# Patient Record
Sex: MALE
[De-identification: names, ages, dates, MRNs, and addresses within clinical notes are randomized per-mention and may not be internally consistent; named-entity substitution may affect disease eponyms.]

---

## 2017-06-02 NOTE — PATIENT DISCUSSION
Patient started on BRIMONIDINE 0.15% or 0.2% TID for neuroprotection and DORZOLAMIDE TID for blood flow till bottle runs out.

## 2017-06-02 NOTE — PATIENT DISCUSSION
RECOM EVAL IN ER TODAY - DIZZINESS AND HA - TO EVALUATE FOR CNS INVOLVMENT AND TO RULE OUT TEMPORAL ARTERITIS.

## 2017-06-02 NOTE — PATIENT DISCUSSION
Carotid Doppler’s 50-69% bilateral obstruction on 6 4 17 -  PT SENT TO VASCULAR SURGEON BY ER - echo cardiogram pending.

## 2017-06-23 NOTE — PATIENT DISCUSSION
CAROTID DOPPLER'S DONE 6 5 17 - SHOWED BILATERAL PROGRESSION OF  CAROTID STENOSIS  50-69% OU - S/P CAROTID ON RIGHT  - TENOSIS WAS 70% - IS GOING TO REQUIRE ADDITIONAL SURGERY.

## 2017-09-29 NOTE — PATIENT DISCUSSION
CONT GENERIC PLAVIX - PT WAS TOLD TO STOP TAKING IT - RECOM F/U INTERNIST TO SEE IF SHE CAN GO BACK ON IT AFTER SURGERY MONDAY.

## 2018-09-08 ENCOUNTER — HOSPITAL (OUTPATIENT)
Dept: OTHER | Age: 68
End: 2018-09-08
Attending: EMERGENCY MEDICINE

## 2021-10-08 NOTE — PATIENT DISCUSSION
10 8 21 FLUCTUATES - BETTER THAN ORIGINAL VF BUT NOT AS GOOD AS LAST FIELD - RECOM REEVAL WITH DR SMITH.

## 2021-12-07 ENCOUNTER — RESULT REVIEW (OUTPATIENT)
Age: 71
End: 2021-12-07

## 2021-12-08 ENCOUNTER — RESULT REVIEW (OUTPATIENT)
Age: 71
End: 2021-12-08

## 2021-12-10 ENCOUNTER — RESULT REVIEW (OUTPATIENT)
Age: 71
End: 2021-12-10

## 2021-12-10 PROBLEM — Z00.00 ENCOUNTER FOR PREVENTIVE HEALTH EXAMINATION: Status: ACTIVE | Noted: 2021-12-10

## 2021-12-14 ENCOUNTER — APPOINTMENT (OUTPATIENT)
Dept: HEMATOLOGY ONCOLOGY | Facility: CLINIC | Age: 71
End: 2021-12-14
Payer: MEDICARE

## 2021-12-14 ENCOUNTER — NON-APPOINTMENT (OUTPATIENT)
Age: 71
End: 2021-12-14

## 2021-12-14 VITALS
HEIGHT: 70 IN | SYSTOLIC BLOOD PRESSURE: 134 MMHG | OXYGEN SATURATION: 97 % | HEART RATE: 76 BPM | RESPIRATION RATE: 18 BRPM | DIASTOLIC BLOOD PRESSURE: 77 MMHG | BODY MASS INDEX: 27.63 KG/M2 | TEMPERATURE: 98.2 F | WEIGHT: 193 LBS

## 2021-12-14 DIAGNOSIS — Z80.8 FAMILY HISTORY OF MALIGNANT NEOPLASM OF OTHER ORGANS OR SYSTEMS: ICD-10-CM

## 2021-12-14 DIAGNOSIS — Z80.1 FAMILY HISTORY OF MALIGNANT NEOPLASM OF TRACHEA, BRONCHUS AND LUNG: ICD-10-CM

## 2021-12-14 DIAGNOSIS — Z78.9 OTHER SPECIFIED HEALTH STATUS: ICD-10-CM

## 2021-12-14 PROCEDURE — 36415 COLL VENOUS BLD VENIPUNCTURE: CPT

## 2021-12-14 PROCEDURE — 99215 OFFICE O/P EST HI 40 MIN: CPT | Mod: 25

## 2021-12-14 RX ORDER — PANTOPRAZOLE 40 MG/1
40 TABLET, DELAYED RELEASE ORAL
Refills: 0 | Status: ACTIVE | COMMUNITY
Start: 2021-12-14

## 2021-12-14 RX ORDER — METHYLDOPA/HYDROCHLOROTHIAZIDE 250MG-15MG
TABLET ORAL
Refills: 0 | Status: ACTIVE | COMMUNITY
Start: 2021-12-14

## 2021-12-14 RX ORDER — MULTIVITAMIN
TABLET ORAL
Refills: 0 | Status: ACTIVE | COMMUNITY
Start: 2021-12-14

## 2021-12-14 NOTE — HISTORY OF PRESENT ILLNESS
[de-identified] : Mr. Fish is a 71 year old man who presents for initial consultation of recurrent PE.\par He was admitted to [place] 11/21/2021 for dyspnea and cough with right sided chest pain radiating to his back.\par He was started on eliquis as inpatient.\par He was also found to be febrile, and have pneumonia with a pleural effusion; s/p antibiotics and chest tube placement.\par Blood work completed as inpatient: +cardiolipins, low antithrombin III, factor VIII, factor X\par Factor V Leiden and prothrombin gene mutation normal\par He was readmitted to [facility] 12/6/2021 for SOB and nagging cough, chest tube placed again s/p TPA; discharged 12/13/2021, in middle of 21 day course antibiotics ceftriaxone -> ceftin\par \par Of note, he developed prior PE ~4 years ago (was on AC x 1 year, provoked by 32-hour travel in car) and he was also briefly on MTX for RA\par He reports feeling better, denies fevers, SOB, palpitations, nausea, vomiting, constipation, diarrhea, and bleeding.\par He has lost about 10-12lbs due to recurrent hospitalizations.\par \par He works as contractor\par \par 2 Children and 4 grandchildren alive and well\par Mother: unknown clotting disorder

## 2021-12-14 NOTE — ASSESSMENT
[FreeTextEntry1] : #PE\par seems unprovoked, mother has family history of clots\par Prothrombin gene mutation and factor V Leiden normal\par rest of work up can not be conclusive in the acute clot setting - will need to recheck in 3-6 months\par continue eliquis 5 mg BID - refilled today. Reviewed side effects and he expresses understanding\par \par \par #PNA\par currently on ertapenum and followed by Dr. Major\par Followed by Dr. Quinones\par s/p chest tube for pleural effusion\par \par #RA\par Currently not on medications for it\par \par RTC in 3 months with cbc with diff, cmp, d dimer, hypercoag work up minus factor v leiden and prothrombin gene mutation but including protein c and protein s

## 2022-01-10 ENCOUNTER — APPOINTMENT (OUTPATIENT)
Dept: PULMONOLOGY | Facility: CLINIC | Age: 72
End: 2022-01-10
Payer: COMMERCIAL

## 2022-01-10 VITALS
HEIGHT: 70 IN | TEMPERATURE: 98.1 F | DIASTOLIC BLOOD PRESSURE: 80 MMHG | HEART RATE: 77 BPM | OXYGEN SATURATION: 98 % | WEIGHT: 193 LBS | BODY MASS INDEX: 27.63 KG/M2 | SYSTOLIC BLOOD PRESSURE: 150 MMHG

## 2022-01-10 DIAGNOSIS — I27.20 PULMONARY HYPERTENSION, UNSPECIFIED: ICD-10-CM

## 2022-01-10 PROCEDURE — 99214 OFFICE O/P EST MOD 30 MIN: CPT

## 2022-01-10 NOTE — REASON FOR VISIT
[Follow-Up - From Hospitalization] : a follow-up visit after a recent hospitalization [Pneumonia] : pneumonia [Pulmonary Embolism] : pulmonary embolism [Pulmonary Hypertension] : pulmonary hypertension

## 2022-01-10 NOTE — PHYSICAL EXAM
[No Acute Distress] : no acute distress [Normal Appearance] : normal appearance [No Neck Mass] : no neck mass [Normal Rate/Rhythm] : normal rate/rhythm [Normal S1, S2] : normal s1, s2 [No Edema] : no edema [No Resp Distress] : no resp distress [No Acc Muscle Use] : no acc muscle use [Clear to Auscultation Bilaterally] : clear to auscultation bilaterally [No Abnormalities] : no abnormalities [Normal Gait] : normal gait [No Clubbing] : no clubbing [No Cyanosis] : no cyanosis [No Focal Deficits] : no focal deficits [Oriented x3] : oriented x3 [Normal Affect] : normal affect

## 2022-01-10 NOTE — HISTORY OF PRESENT ILLNESS
[TextBox_4] : 71 year old male with RA not on treatment came for f/u after hospitalization.\par He was initially admitted in Nov 2021 with dyspnea and CP and was diagnosed with PE with mild PH.\par Was started on AC and d/c home. Few days later he became febrile and had more cough and was readmitted with new RtLL infiltrate and loculated Rt effusion. He required pigtail placement and tPA in the pleural space.\par Was d/c on prolonged course of ABX.\par Last CXR seen, my read: significant improvement in the Rt effusion and Rt infiltrate\par Was seen by Hematology, note seen.\par He had a provoked PE few years ago and was on short term AC\par \par He feels better since the hospital admission. Dyspnea is much better. Able to climb 1 flight of stairs.\par He finished ABX 1 week ago.\par Denies fever. \par No nausea vomiting.\par Denies cough, CP, hemoptysis\par \par \par \par SHX: non smoker\par FHX: blood clots\par \par

## 2022-02-22 NOTE — HISTORY OF PRESENT ILLNESS
[de-identified] : Mr. Fish is a 71 year old man who presents for initial consultation of recurrent PE.\par He was admitted to [facility] 11/21/2021 for dyspnea and cough with right sided chest pain radiating to his back.\par He was started on eliquis as inpatient.\par He was also found to be febrile, and have pneumonia with a pleural effusion; s/p antibiotics and chest tube placement.\par Blood work completed as inpatient: +cardiolipins, low antithrombin III, factor VIII, factor X\par Factor V Leiden and prothrombin gene mutation normal\par He was readmitted to [facility] 12/6/2021 for SOB and nagging cough, chest tube placed again s/p TPA; discharged 12/13/2021, in middle of 21 day course antibiotics ceftriaxone -> ceftin\par \par Of note, he developed prior PE ~4 years ago (was on AC x 1 year, provoked by 32-hour travel in car) and he was also briefly on MTX for RA\par He reports feeling better, denies fevers, SOB, palpitations, nausea, vomiting, constipation, diarrhea, and bleeding.\par He has lost about 10-12lbs due to recurrent hospitalizations.\par \par He works as contractor\par \par 2 Children and 4 grandchildren alive and well\par Mother: unknown clotting disorder

## 2022-02-28 ENCOUNTER — APPOINTMENT (OUTPATIENT)
Dept: HEMATOLOGY ONCOLOGY | Facility: CLINIC | Age: 72
End: 2022-02-28

## 2022-03-14 ENCOUNTER — EMERGENCY VISIT (OUTPATIENT)
Dept: URBAN - METROPOLITAN AREA CLINIC 47 | Facility: CLINIC | Age: 72
End: 2022-03-14

## 2022-03-14 DIAGNOSIS — H04.123: ICD-10-CM

## 2022-03-14 DIAGNOSIS — Z96.1: ICD-10-CM

## 2022-03-14 DIAGNOSIS — H26.493: ICD-10-CM

## 2022-03-14 PROCEDURE — 92002 INTRM OPH EXAM NEW PATIENT: CPT

## 2022-03-14 PROCEDURE — 99199RSP RESIDENT SUPERVISED BY PROVIDER

## 2022-03-14 ASSESSMENT — VISUAL ACUITY
OD_SC: 20/20
OS_SC: J4
OS_SC: 20/20
OD_SC: J4

## 2022-03-14 ASSESSMENT — TONOMETRY
OS_IOP_MMHG: 11
OD_IOP_MMHG: 9

## 2022-03-14 NOTE — PATIENT DISCUSSION
Overlapping of images improved with instillation of ATs. Pt stated last night sleeping with a fan on. Consider cyclosporine if no improvement with ATs.

## 2022-03-16 NOTE — PATIENT DISCUSSION
PT HAD CAROTID 3 MONTHS AGO - IS FOLLOWING UP WITH INTERNIST ON THESE. Spoke with patient regarding rescheduling missed POST-OP appointment . Scheduled patient for a Telephone Visit due to patient having car issues. 1-2 wk po dos 3/2/22  Provided email for patient to send photos prior to appointment at  [email or web address] . Patient is aware of date and time of appointment.-Per Patient    normal... Well appearing, well nourished, awake, alert, oriented to person, place, time/situation and in no apparent distress.

## 2022-03-28 ENCOUNTER — COMPREHENSIVE EXAM (OUTPATIENT)
Dept: URBAN - METROPOLITAN AREA CLINIC 47 | Facility: CLINIC | Age: 72
End: 2022-03-28

## 2022-03-28 DIAGNOSIS — H52.4: ICD-10-CM

## 2022-03-28 DIAGNOSIS — H04.123: ICD-10-CM

## 2022-03-28 DIAGNOSIS — H35.362: ICD-10-CM

## 2022-03-28 DIAGNOSIS — H26.493: ICD-10-CM

## 2022-03-28 DIAGNOSIS — Z96.1: ICD-10-CM

## 2022-03-28 PROCEDURE — 92015 DETERMINE REFRACTIVE STATE: CPT

## 2022-03-28 PROCEDURE — 92014 COMPRE OPH EXAM EST PT 1/>: CPT

## 2022-03-28 ASSESSMENT — TONOMETRY
OS_IOP_MMHG: 10
OD_IOP_MMHG: 10

## 2022-03-28 ASSESSMENT — VISUAL ACUITY
OD_CC: J1
OS_SC: 20/20
OS_SC: J8
OD_SC: 20/20
OS_CC: J1
OU_SC: J8
OD_SC: J8
OU_CC: J1
OU_SC: 20/20

## 2022-04-11 ENCOUNTER — NON-APPOINTMENT (OUTPATIENT)
Age: 72
End: 2022-04-11

## 2022-06-30 ENCOUNTER — RESULT REVIEW (OUTPATIENT)
Age: 72
End: 2022-06-30

## 2022-06-30 ENCOUNTER — APPOINTMENT (OUTPATIENT)
Dept: HEMATOLOGY ONCOLOGY | Facility: CLINIC | Age: 72
End: 2022-06-30

## 2022-06-30 VITALS
WEIGHT: 187.6 LBS | BODY MASS INDEX: 26.86 KG/M2 | RESPIRATION RATE: 18 BRPM | HEART RATE: 56 BPM | OXYGEN SATURATION: 97 % | HEIGHT: 70 IN | TEMPERATURE: 98.2 F | SYSTOLIC BLOOD PRESSURE: 130 MMHG | DIASTOLIC BLOOD PRESSURE: 78 MMHG

## 2022-08-01 ENCOUNTER — NON-APPOINTMENT (OUTPATIENT)
Age: 72
End: 2022-08-01

## 2022-09-12 ENCOUNTER — NON-APPOINTMENT (OUTPATIENT)
Age: 72
End: 2022-09-12

## 2022-09-13 ENCOUNTER — RESULT REVIEW (OUTPATIENT)
Age: 72
End: 2022-09-13

## 2022-09-13 ENCOUNTER — APPOINTMENT (OUTPATIENT)
Dept: HEMATOLOGY ONCOLOGY | Facility: CLINIC | Age: 72
End: 2022-09-13

## 2022-09-13 VITALS
OXYGEN SATURATION: 98 % | HEART RATE: 60 BPM | SYSTOLIC BLOOD PRESSURE: 135 MMHG | RESPIRATION RATE: 18 BRPM | DIASTOLIC BLOOD PRESSURE: 78 MMHG | WEIGHT: 182 LBS | BODY MASS INDEX: 26.05 KG/M2 | TEMPERATURE: 97.5 F | HEIGHT: 70 IN

## 2023-06-12 ENCOUNTER — COMPREHENSIVE EXAM (OUTPATIENT)
Dept: URBAN - METROPOLITAN AREA CLINIC 47 | Facility: CLINIC | Age: 73
End: 2023-06-12

## 2023-06-12 DIAGNOSIS — H04.123: ICD-10-CM

## 2023-06-12 DIAGNOSIS — H52.4: ICD-10-CM

## 2023-06-12 DIAGNOSIS — H35.362: ICD-10-CM

## 2023-06-12 DIAGNOSIS — Z96.1: ICD-10-CM

## 2023-06-12 DIAGNOSIS — H26.493: ICD-10-CM

## 2023-06-12 PROCEDURE — 92015 DETERMINE REFRACTIVE STATE: CPT

## 2023-06-12 PROCEDURE — 92014 COMPRE OPH EXAM EST PT 1/>: CPT

## 2023-06-12 ASSESSMENT — VISUAL ACUITY
OS_SC: J10
OS_SC: 20/20
OD_SC: J10
OD_SC: 20/25
OU_SC: J6
OU_SC: 20/20

## 2023-06-12 ASSESSMENT — TONOMETRY
OD_IOP_MMHG: 13
OS_IOP_MMHG: 10

## 2023-07-10 ENCOUNTER — APPOINTMENT (OUTPATIENT)
Dept: HEMATOLOGY ONCOLOGY | Facility: CLINIC | Age: 73
End: 2023-07-10

## 2023-08-07 NOTE — PATIENT DISCUSSION
ARTIFICIAL TEARS to affected eye(s) as needed.
CONT GENERIC PLAVIX.
Carotid Doppler’s and echo cardiogram pending.
Does NOT APPEAR VISUALLY SIGNIFICANT.
MONITOR response to TREATMENT.
My findings and recommendations are based on patient's symptoms, eye exam, diagnostic testing, and records.
No retinal holes or tears seen on exam. Recommended OBSERVATION. We reviewed the signs and symptoms of retinal tear/retinal detachment and the importance of prompt evaluation should there be increasing floaters, new flashing lights, or decreasing peripheral vision in either eye at any time. Patient understands condition, prognosis and need for follow up care.
No retinal tears or retinal detachment seen on clinical exam today. Reviewed the signs and symptoms of retinal tear/retinal detachment and the importance of calling for prompt evaluation should there be increasing floaters, new flashing lights, or decreasing peripheral vision in either eye at any time. Observation recommended.
Patient started on BRIMONIDINE 0.15% or 0.2% TID for neuroprotection and DORZOLAMIDE TID for blood flow till bottle runs out.
RECOM EVAL IN ER TODAY - DIZZINESS AND HA - TO EVALUATE FOR CNS INVOLVMENT AND TO RULE OUT TEMPORAL ARTERITIS.
Recommend CAROTID DOPPLER'S AND ECHO CARDIOGRAM.
Recommended OBSERVATION and MONITORING for change.
Recommended OBSERVATION and continued MONITORING for progression.
Recommended stat SED RATE and CRP for temporal (giant cell) arteritis.
Well positioned.
none

## 2023-10-23 ENCOUNTER — RESULT REVIEW (OUTPATIENT)
Age: 73
End: 2023-10-23

## 2023-10-23 ENCOUNTER — APPOINTMENT (OUTPATIENT)
Dept: HEMATOLOGY ONCOLOGY | Facility: CLINIC | Age: 73
End: 2023-10-23
Payer: MEDICARE

## 2023-10-23 VITALS
WEIGHT: 196.38 LBS | BODY MASS INDEX: 28.12 KG/M2 | RESPIRATION RATE: 16 BRPM | DIASTOLIC BLOOD PRESSURE: 89 MMHG | SYSTOLIC BLOOD PRESSURE: 152 MMHG | HEART RATE: 63 BPM | TEMPERATURE: 97.2 F | OXYGEN SATURATION: 98 % | HEIGHT: 70 IN

## 2023-10-23 DIAGNOSIS — I26.99 OTHER PULMONARY EMBOLISM W/OUT ACUTE COR PULMONALE: ICD-10-CM

## 2023-10-23 DIAGNOSIS — D64.9 ANEMIA, UNSPECIFIED: ICD-10-CM

## 2023-10-23 DIAGNOSIS — J18.9 PNEUMONIA, UNSPECIFIED ORGANISM: ICD-10-CM

## 2023-10-23 DIAGNOSIS — M06.9 RHEUMATOID ARTHRITIS, UNSPECIFIED: ICD-10-CM

## 2023-10-23 PROCEDURE — 36415 COLL VENOUS BLD VENIPUNCTURE: CPT

## 2023-10-23 PROCEDURE — 99214 OFFICE O/P EST MOD 30 MIN: CPT | Mod: 25

## 2023-10-23 RX ORDER — APIXABAN 5 MG/1
5 TABLET, FILM COATED ORAL
Qty: 60 | Refills: 2 | Status: DISCONTINUED | COMMUNITY
Start: 2021-12-14 | End: 2023-10-23

## 2023-10-23 RX ORDER — APIXABAN 5 MG/1
5 TABLET, FILM COATED ORAL
Qty: 60 | Refills: 3 | Status: DISCONTINUED | COMMUNITY
Start: 2021-12-14 | End: 2023-10-23

## 2023-10-23 RX ORDER — APIXABAN 2.5 MG/1
2.5 TABLET, FILM COATED ORAL
Qty: 180 | Refills: 3 | Status: ACTIVE | COMMUNITY
Start: 2022-09-23 | End: 1900-01-01

## 2023-11-18 PROBLEM — D64.9 ANEMIA, UNSPECIFIED TYPE: Status: ACTIVE | Noted: 2023-11-18

## 2023-11-18 PROBLEM — M06.9 RHEUMATOID ARTHRITIS: Status: ACTIVE | Noted: 2021-12-14

## 2023-11-18 PROBLEM — I26.99 PULMONARY EMBOLISM: Status: ACTIVE | Noted: 2021-12-14

## 2023-11-18 PROBLEM — J18.9 PNEUMONIA: Status: ACTIVE | Noted: 2021-12-14

## 2023-12-11 ENCOUNTER — RESULT REVIEW (OUTPATIENT)
Age: 73
End: 2023-12-11

## 2023-12-11 ENCOUNTER — APPOINTMENT (OUTPATIENT)
Dept: HEMATOLOGY ONCOLOGY | Facility: CLINIC | Age: 73
End: 2023-12-11

## 2023-12-11 VITALS
BODY MASS INDEX: 28.21 KG/M2 | DIASTOLIC BLOOD PRESSURE: 84 MMHG | WEIGHT: 197.06 LBS | SYSTOLIC BLOOD PRESSURE: 153 MMHG | HEART RATE: 63 BPM | OXYGEN SATURATION: 97 % | HEIGHT: 70 IN | RESPIRATION RATE: 16 BRPM | TEMPERATURE: 97.4 F

## 2023-12-12 NOTE — PATIENT DISCUSSION
My findings and recommendations are based on patient's symptoms, eye exam, diagnostic testing, and records. none

## 2024-02-21 NOTE — PATIENT DISCUSSION
Chronic ongoing problem, continue medical therapy with levothyroxine 25 mcg daily.   Hx BRAO. Stable.

## 2024-07-03 ENCOUNTER — COMPREHENSIVE EXAM (OUTPATIENT)
Dept: URBAN - METROPOLITAN AREA CLINIC 47 | Facility: CLINIC | Age: 74
End: 2024-07-03

## 2024-07-03 DIAGNOSIS — H26.493: ICD-10-CM

## 2024-07-03 DIAGNOSIS — H04.123: ICD-10-CM

## 2024-07-03 DIAGNOSIS — H52.4: ICD-10-CM

## 2024-07-03 DIAGNOSIS — H35.363: ICD-10-CM

## 2024-07-03 DIAGNOSIS — Z96.1: ICD-10-CM

## 2024-07-03 PROCEDURE — 92250 FUNDUS PHOTOGRAPHY W/I&R: CPT

## 2024-07-03 PROCEDURE — 92015 DETERMINE REFRACTIVE STATE: CPT

## 2024-07-03 PROCEDURE — 99214 OFFICE O/P EST MOD 30 MIN: CPT

## 2024-07-03 RX ORDER — CYCLOSPORINE 0.5 MG/ML: 1 EMULSION OPHTHALMIC TWICE A DAY

## 2024-07-03 ASSESSMENT — VISUAL ACUITY
OD_SC: 20/25
OS_SC: 20/20-1
OS_SC: J5-
OD_SC: J7

## 2024-07-03 ASSESSMENT — TONOMETRY
OS_IOP_MMHG: 12
OD_IOP_MMHG: 12

## 2024-10-21 ENCOUNTER — APPOINTMENT (OUTPATIENT)
Dept: HEMATOLOGY ONCOLOGY | Facility: CLINIC | Age: 74
End: 2024-10-21

## 2024-10-25 ENCOUNTER — APPOINTMENT (OUTPATIENT)
Dept: HEMATOLOGY ONCOLOGY | Facility: CLINIC | Age: 74
End: 2024-10-25

## 2024-11-05 ENCOUNTER — PREPPED CHART (OUTPATIENT)
Dept: URBAN - METROPOLITAN AREA CLINIC 47 | Facility: CLINIC | Age: 74
End: 2024-11-05